# Patient Record
Sex: FEMALE | URBAN - METROPOLITAN AREA
[De-identification: names, ages, dates, MRNs, and addresses within clinical notes are randomized per-mention and may not be internally consistent; named-entity substitution may affect disease eponyms.]

---

## 2019-07-09 ENCOUNTER — OFFICE VISIT (OUTPATIENT)
Dept: URBAN - METROPOLITAN AREA CLINIC 48 | Facility: CLINIC | Age: 74
End: 2019-07-09
Payer: COMMERCIAL

## 2019-07-09 DIAGNOSIS — H10.13 ACUTE ATOPIC CONJUNCTIVITIS, BILATERAL: ICD-10-CM

## 2019-07-09 PROCEDURE — 92004 COMPRE OPH EXAM NEW PT 1/>: CPT | Performed by: OPTOMETRIST

## 2019-07-09 ASSESSMENT — INTRAOCULAR PRESSURE
OS: 15
OD: 19

## 2019-07-09 NOTE — IMPRESSION/PLAN
Impression: Acute atopic conjunctivitis, bilateral: H10.13. OU. Plan: Cold compresses prn, Zaditor BID OU x 2-3 weeks. RTC if symptoms do not improve in 2-3 weeks, consider Alrex for redness.

## 2019-07-09 NOTE — IMPRESSION/PLAN
Impression: Combined forms of age-related cataract, bilateral: H25.813. OU. Plan: Discussed cataract diagnosis with the patient. Discussed and reviewed treatment options for cataracts. Risks and benefits of surgical treatment were discussed and understood. Refer for jasson galo/Dr. Lupe Hughes, OS>OD.

## 2019-08-06 ENCOUNTER — OFFICE VISIT (OUTPATIENT)
Dept: URBAN - METROPOLITAN AREA CLINIC 48 | Facility: CLINIC | Age: 74
End: 2019-08-06
Payer: COMMERCIAL

## 2019-08-06 PROCEDURE — 92012 INTRM OPH EXAM EST PATIENT: CPT | Performed by: OPHTHALMOLOGY

## 2019-08-06 ASSESSMENT — KERATOMETRY
OD: 44.38
OS: 44.13

## 2019-08-06 ASSESSMENT — INTRAOCULAR PRESSURE
OS: 15
OD: 16

## 2019-08-06 NOTE — IMPRESSION/PLAN
Impression: Combined forms of age-related cataract, bilateral: H25.813. Plan: The patient has a visually significant cataract in both eyes. After discussion with the patient and careful examination it has been determined that a cataract in both eyes is accounting for a significant amount of the patient's visual symptoms. Cataract surgery and the associated risks, benefits, alternatives, expectations, and recovery were discussed in detail with the patient. All questions were answered. The patient understands that there may be some limitation in visual potential given any pre-existing ocular disease. The patient desires cataract surgery in both eyes. Schedule cataract surgery in both eyes; left eye, then right eye. RL 2 Patient decides to go with Standard lens, distance target Dr. Miguel Bernal

## 2019-08-13 ENCOUNTER — TESTING ONLY (OUTPATIENT)
Dept: URBAN - METROPOLITAN AREA CLINIC 48 | Facility: CLINIC | Age: 74
End: 2019-08-13
Payer: COMMERCIAL

## 2019-08-13 DIAGNOSIS — H25.813 COMBINED FORMS OF AGE-RELATED CATARACT, BILATERAL: Primary | ICD-10-CM

## 2019-08-13 PROCEDURE — 92136 OPHTHALMIC BIOMETRY: CPT | Performed by: OPHTHALMOLOGY

## 2019-08-13 RX ORDER — KETOROLAC TROMETHAMINE 5 MG/ML
0.5 % SOLUTION OPHTHALMIC
Qty: 5 | Refills: 1 | Status: INACTIVE
Start: 2019-08-13 | End: 2021-03-12

## 2019-08-13 RX ORDER — OFLOXACIN 3 MG/ML
0.3 % SOLUTION/ DROPS OPHTHALMIC
Qty: 5 | Refills: 1 | Status: INACTIVE
Start: 2019-08-13 | End: 2021-03-12

## 2019-08-13 RX ORDER — PREDNISOLONE ACETATE 10 MG/ML
1 % SUSPENSION/ DROPS OPHTHALMIC
Qty: 10 | Refills: 1 | Status: INACTIVE
Start: 2019-08-13 | End: 2021-03-12

## 2019-08-13 ASSESSMENT — PACHYMETRY
OS: 22.75
OD: 2.40
OD: 22.52
OS: 2.62

## 2019-08-20 ENCOUNTER — SURGERY (OUTPATIENT)
Dept: URBAN - METROPOLITAN AREA SURGERY 26 | Facility: SURGERY | Age: 74
End: 2019-08-20
Payer: COMMERCIAL

## 2019-08-20 PROCEDURE — 66984 XCAPSL CTRC RMVL W/O ECP: CPT | Performed by: OPHTHALMOLOGY

## 2019-08-21 ENCOUNTER — POST-OPERATIVE VISIT (OUTPATIENT)
Dept: URBAN - METROPOLITAN AREA CLINIC 48 | Facility: CLINIC | Age: 74
End: 2019-08-21

## 2019-08-21 PROCEDURE — 99024 POSTOP FOLLOW-UP VISIT: CPT | Performed by: OPHTHALMOLOGY

## 2019-08-21 ASSESSMENT — INTRAOCULAR PRESSURE: OS: 20

## 2019-08-27 ENCOUNTER — POST-OPERATIVE VISIT (OUTPATIENT)
Dept: URBAN - METROPOLITAN AREA CLINIC 48 | Facility: CLINIC | Age: 74
End: 2019-08-27

## 2019-08-27 PROCEDURE — 99024 POSTOP FOLLOW-UP VISIT: CPT | Performed by: OPHTHALMOLOGY

## 2019-08-27 ASSESSMENT — INTRAOCULAR PRESSURE: OS: 20

## 2019-09-13 ENCOUNTER — POST-OPERATIVE VISIT (OUTPATIENT)
Dept: URBAN - METROPOLITAN AREA CLINIC 48 | Facility: CLINIC | Age: 74
End: 2019-09-13

## 2019-09-13 PROCEDURE — 99024 POSTOP FOLLOW-UP VISIT: CPT | Performed by: OPTOMETRIST

## 2019-09-13 ASSESSMENT — INTRAOCULAR PRESSURE: OS: 11

## 2019-10-15 ENCOUNTER — SURGERY (OUTPATIENT)
Dept: URBAN - METROPOLITAN AREA SURGERY 26 | Facility: SURGERY | Age: 74
End: 2019-10-15
Payer: COMMERCIAL

## 2019-10-15 PROCEDURE — 66984 XCAPSL CTRC RMVL W/O ECP: CPT | Performed by: OPHTHALMOLOGY

## 2019-10-16 ENCOUNTER — POST-OPERATIVE VISIT (OUTPATIENT)
Dept: URBAN - METROPOLITAN AREA CLINIC 48 | Facility: CLINIC | Age: 74
End: 2019-10-16

## 2019-10-16 DIAGNOSIS — Z09 ENCNTR FOR F/U EXAM AFT TRTMT FOR COND OTH THAN MALIG NEOPLM: Primary | ICD-10-CM

## 2019-10-16 PROCEDURE — 99024 POSTOP FOLLOW-UP VISIT: CPT | Performed by: OPTOMETRIST

## 2019-10-16 ASSESSMENT — INTRAOCULAR PRESSURE: OD: 15

## 2019-10-22 ENCOUNTER — POST-OPERATIVE VISIT (OUTPATIENT)
Dept: URBAN - METROPOLITAN AREA CLINIC 48 | Facility: CLINIC | Age: 74
End: 2019-10-22
Payer: COMMERCIAL

## 2019-10-22 PROCEDURE — 99024 POSTOP FOLLOW-UP VISIT: CPT | Performed by: OPTOMETRIST

## 2019-10-22 ASSESSMENT — INTRAOCULAR PRESSURE
OS: 14
OD: 14

## 2021-03-12 ENCOUNTER — OFFICE VISIT (OUTPATIENT)
Dept: URBAN - METROPOLITAN AREA CLINIC 48 | Facility: CLINIC | Age: 76
End: 2021-03-12
Payer: COMMERCIAL

## 2021-03-12 DIAGNOSIS — E11.9 TYPE 2 DIABETES MELLITUS W/O COMPLICATION: Primary | ICD-10-CM

## 2021-03-12 DIAGNOSIS — H26.492 OTHER SECONDARY CATARACT OF LEFT EYE: ICD-10-CM

## 2021-03-12 PROCEDURE — 99214 OFFICE O/P EST MOD 30 MIN: CPT | Performed by: OPTOMETRIST

## 2021-03-12 ASSESSMENT — INTRAOCULAR PRESSURE
OS: 13
OD: 12

## 2021-03-12 NOTE — IMPRESSION/PLAN
Impression: Type 2 diabetes mellitus w/o complication: P47.8. Plan: No diabetic retinopathy present on exam. No treatment is needed. Discussed with patient the importance of glucose control and ocular risk to prevent the progression to Retinopathy.  

Follow up annually with dilated fundus exam.

## 2021-03-12 NOTE — IMPRESSION/PLAN
Impression: Other secondary cataract of left eye: H26.492. Plan: Trace PCO noted on exam.  Discussed with patient. Will monitor for now. RTC 1 year or PRN for decreased vision or increased glare.

## 2023-01-04 ENCOUNTER — OFFICE VISIT (OUTPATIENT)
Dept: URBAN - METROPOLITAN AREA CLINIC 48 | Facility: CLINIC | Age: 78
End: 2023-01-04
Payer: COMMERCIAL

## 2023-01-04 DIAGNOSIS — E11.9 TYPE 2 DIABETES MELLITUS W/O COMPLICATION: Primary | ICD-10-CM

## 2023-01-04 DIAGNOSIS — H04.123 DRY EYE SYNDROME OF BILATERAL LACRIMAL GLANDS: ICD-10-CM

## 2023-01-04 PROCEDURE — 99204 OFFICE O/P NEW MOD 45 MIN: CPT | Performed by: STUDENT IN AN ORGANIZED HEALTH CARE EDUCATION/TRAINING PROGRAM

## 2023-01-04 ASSESSMENT — VISUAL ACUITY
OS: 20/30
OD: 20/25

## 2023-01-04 ASSESSMENT — INTRAOCULAR PRESSURE
OS: 15
OD: 16

## 2023-01-04 NOTE — IMPRESSION/PLAN
Impression: Dry eye syndrome of bilateral lacrimal glands: H04.123. Plan: Dry eyes likely cause for the patient's complaints. There is no evidence of permanent changes to the cornea. Explained the nature of the condition and need for lubrication with artificial tears Start AT QID OU and Pataday QD OU. 

 RTC 1 month dry eye check

## 2023-01-04 NOTE — IMPRESSION/PLAN
Impression: Type 2 diabetes mellitus w/o complication: Q44.4.  Plan: - No retinopathy seen on exam today
- Continue glucose, BP, and lipid control as per PCP
- Continue with annual DFE

## 2023-11-01 NOTE — IMPRESSION/PLAN
CARDIOLOGY OFFICE NOTE        ORIGINAL REASON FOR CONSULT:  Palpitations/Aortic Valve Insufficiency (11/1/2023)  Michelle Hoover MD   [address] [address]    Farida Rios is a 50 year old female with the following issues: CARDIAC INVESTIGATIONS/IMAGING   1. Age 50  2. HTN / Dyslipidemia   3. DM II   4. Obesity Body mass index is 31.64 kg/m².   5. Carpal Tunnel s/p L release 11/22/10, s/p R release 12/15/14 (Dr. Hwang)  6. Seronegative RA (was on Xeljanz)  7. COVID + (01/07/22)    NT proBNP:  None  10 year ASCVD risk:  6.3%  [id number] Score: 3   H2FpEF Score:  3  NYHA FC:    LEAST WEIGHT:     LPa: NA  Plaque Years: 8,352 (, 10/4/23) 1. ECHO (07/28/2023): LVEF 63% E/e' 11.24 RVSP 20 mmHg GLS -21% Nadine 30.9 ml/m²  2. ECG (02/06/20):  NSR. Nonspecific T abnormality. Prolonged QT  3. 1 WK MONITOR (08/04/21): NSR. Avg HR 80 bpm. No PAV/PAC.   4. 48 HR HOLTER (07/21/21): NSR. Avg HR 90,  bpm. <0.01% PAC/PVC burden         Ms. Rios presents upon referral by her PCP for evaluation of palpitations. 48 hr holter and 7 day event monitor in 2021 were done d/t similar symptoms; results were unremarkable. This was the first referral to cardiology; however, patient was referred again for another diagnosis.  Patient saw PCP on 6/29/23 where patient was due for f/u echocardiogram this year d/t aortic regurgitation. Echo continued to show moderate aortic regurgitation and her LV chambers had increased in size. Patient was referred to cardiology for further evaluation.    Today, patient reports that she feels \"good, but tired.\" She reports SOB on exertion. She has had episodes since 2021 that have remained stable over the last couple of years. Patient states that she does not have difficulty doing the activities around her house, however, she has low energy level. She reports palpitations, with exercise and at rest. She often feels her heart \"pounding.\" The \"pounding\" is a daily occurrence for her,  Impression: Type 2 diabetes mellitus w/o complication: E43.9. OU. Plan: No Non-Proliferative Diabetic Retinopathy, no Diabetic Macular Edema and no Neovascularization of the iris, disc, or elsewhere. Discussed ocular and systemic benefits of blood sugar control. Send notes to PCP. Check annually. and she states that her heart feels like it is constantly beating rapidly. She has never been worked up for FLORENCIA, and has never been told that she snores. Patient reports occasional swelling in RLE. States that she had a surgery on RLE for a fibrotic cyst that she had removed in 2015. Patient reports that she just recently started back on her pravastatin d/t high cholesterol levels noted by PCP. She is taking all medications as prescribed. Denies claudication symptoms. Denies bleeding concerns, other than hemorrhoids. No home BP readings. Patient reports that she has lost 20lbs since starting Ozempic about a year ago.  Patient reports RA. She has bee off Xeljanz since July.           Patient at this time denies history of orthopnea, paroxysmal nocturnal dyspnea, lightheadedness/dizziness, presyncope/syncope, and any anginal type chest pain.    ECHO TREND     DATE   LVEF   LVEDD  06/22/2021  62%   5.7 CM  06/29/2023  63%   5.6 CM    CARDIAC MEDICATION CHANGES     NA    ACTIVITY LEVEL     No formal exercise routine.  Remains active around the house.       REVIEW OF SYSTEMS     Negative other than what has been specified in the history.     RECENT HOSPITALIZATION/S     None    PERSONAL/SOCIAL HISTORY     Works at [employer] as a  of the [employer] in [place].  Lives in the [place] area.   Has 1 son, Miles.    FAMILY HISTORY     Mother had a stroke around age 69.    No family history of SCD (sudden cardiac death) or premature CAD.    SURGICAL HISTORY     Past Surgical History:   Procedure Laterality Date   • ----------mammogram----------  05/04/2015   • Breast surgery Bilateral 2003    Breast Reduction- Dr Donis   • Breast surgery Right 02/2020    Rt excisional biopsy: 3 fibroadenomas and papillomatosis   • Carpal tunnel release Right 12/15/2014    Carpal tunnel release, Dequervain's release, flexor carpi radialis tunnel decompression & Kenalog injection Dr Hwang   • Carpal tunnel release Left 11/05/2010    •  delivery+postpartum care         • Colonoscopy w biopsy  2012    Jac:  [facility] 5 yrs:  repeat age 52 per Dr. Nathan t/e   • Esophagogastroduodenoscopy  2013   • Esophagogastroduodenoscopy transoral flex w/bx single or mult  2011    Gastritis - Dr Pierce   • Excis lesn tendon shealth leg/ankle Right 2018    Dr. Elder Mustafa    • Excis uterine fibroid,abd apprch  03    Myomectomy--uterine cavity not entered   • Excise breast lesion  1/19/10    R breast - Dr. Mccarthy   • Forearm/wrist surgery unlisted Left 10/10/2011    FCR TUNNEL DECOMPRESSION & REVISION KELOID SCAR    • Hand finger surgery unlisted Bilateral 2020    Rt thumb & lt long finger trig rels (Dr. Hwang)   • Hysterectomy  2009    supra-cervical hysterectomy - right salpingo-oophorectomy   • Incision extensor tendon sheath wrist Left 2010    Ctr poss volar tenosynovectomy & Deq Rels  (Dr. Hwang)   • Laparoscopy,abdm,leroy,oment,dx  97    Laproscopy nl pelvis   • Laparoscopy,fulgur of oviducts  2006    Tubal Ligation, by fulgration   • Lithotripsy - gen'l class     • Past surgical history Left     ovariectomy   • Robotic assisted gynecologic procedure Left 2016    RA LSO (ovariectomy)   • Stereotactic breast biopsy Right 2009    Right Vacuum-Assisted Breast Bx-EMQ-   • Total abdom hysterectomy      with RSO       FLORENCIA RISK ASSESSMENT     Has never been worked up for FLORENCIA. She has not been told that she snores.     PAD/AAA RISK ASSESSMENT (ULTRASOUND/ABIs)     NA    CURRENT MEDICATIONS     Current Outpatient Medications   Medication Sig Dispense Refill   • potassium chloride (K-TAB) 20 MEQ ER tablet 1 tablet 3 times a day 270 tablet 3   • Semaglutide, 1 MG/DOSE, 4 MG/3ML Solution Pen-injector Inject 1 mg into the skin 1 day a week. Indications: Type 2 Diabetes 3 mL 0   • Semaglutide, 2 MG/DOSE, 8 MG/3ML Solution Pen-injector Inject 2 mg into the skin  1 day a week. Indications: Type 2 Diabetes 3 mL 5   • Semaglutide, 2 MG/DOSE, (Ozempic, 2 MG/DOSE,) 8 MG/3ML Solution Pen-injector Inject 2 mg into the skin 1 day a week. 3 mL 5   • Tofacitinib Citrate ER (Xeljanz XR) 11 MG TABLET SR 24 HR Take 1 tablet by mouth daily.     • omeprazole (PrilOSEC) 20 MG capsule Take 1 capsule by mouth half an hour before breakfast 30 capsule 1   • chlorthalidone (THALITONE) 25 MG tablet Take 1 tablet by mouth daily. 90 tablet 1   • pravastatin (PRAVACHOL) 40 MG tablet Take 1 tablet by mouth daily. 90 tablet 1   • metFORMIN (GLUCOPHAGE) 500 MG tablet Take 1 tablet by mouth in the morning and 1 tablet in the evening. Take with meals. 90 tablet 3   • blood glucose test strip Test blood sugar once daily. Diagnosis: E11.9. 100 each 3   • dilTIAZem (CARDIZEM CD) 240 MG 24 hr capsule Take 1 capsule by mouth daily. 90 capsule 1   • estradiol (ESTRACE) 1 MG tablet Take 1 tablet by mouth daily. 90 tablet 3   • electrolyte/PEG 3350 (Nulytely with Flavor Packs) 420 g solution TAKE AS DIRECTED AS INDICATED IN COLONOSCOPY PATIENT INSTRUCTION LETTER 4000 mL 0   • Vitamin D, Ergocalciferol, 1.25 mg (50,000 units) capsule TAKE ONE CAPSULE BY MOUTH ONCE A MONTH 4 capsule 3   • Tofacitinib Citrate ER 11 MG TABLET SR 24 HR Take 1 tablet by mouth daily.     • DISPENSE Testosterone 0.2% in cream. Use 1 cc of cream to inner elbow skin daily 30 mg 11   • blood glucose lancets Test blood sugar once daily. Diagnosis: E11.9. 100 each 3   • blood glucose meter Test blood sugar  daily. Diagnosis: E11.9. Meter: Insurance Approved 1 kit 0   • DISPENSE gym membership  dx htn obesity 1 Each 0     No current facility-administered medications for this visit.        PHYSICAL EXAMINATION         6/23/2023     4:15 PM 6/29/2023     1:25 PM 7/25/2023     1:12 PM 10/4/2023     9:57 AM 11/1/2023     7:20 AM   [facility] Extended Vitals - Weight in Kg/Lb   BP  128/56 133/62 128/62 134/71   Pulse  76 76 82 81   Resp    20    Weight  kg 86.3 kg 90.719 kg  91.173 kg 91.627 kg   Weight lb 190 lb 4.1 oz 200 lb  201 lb 202 lb   Height 5' 7\"    5' 7\"   Height cm 170.2 cm    170.2 cm   BMI 29.8    31.64   Pulse Ox  99 %  98 % 97 %   Patient Position  Sitting  Sitting Sitting   BP Location  LUE - Left upper extremity  RUE - Right upper extremity LUE - Left upper extremity   Cuff Size  Regular  Regular Regular     General:  No acute distress.  HEENT:  PERRL, normocephalic/atraumatic, clear oropharynx.  Neck:  Supple, FROM.  Trachea central.  No JVD (jugular vein distention) or carotid bruit.  CVS:  S1, S2 normal.  3/6 systodiastolic murmur at aortic area.   Pulm:  Clear to auscultation/percussion.  No wheeze/rhonchi/rales.  Ext:  No cyanosis/clubbing/edema.  Skin:  No rash/palpable nodules.    LABORATORY     Recent Labs   Lab 10/04/23  1045 10/02/23  1757 07/28/23  1000 06/29/23  1422 06/22/23  0733 01/18/23  1033 12/30/22  1438   HGB  --  11.3* 12.3  --  12.7   < > 12.6   PLT  --  319 288  --  323   < > 327   Sodium  --  141 140  --  139   < > 140   Potassium  --  3.3* 3.7  --  3.7   < > 3.7   BUN  --  10 14  --  15   < > 13   Creatinine  --  0.86 0.85  --  0.93   < > 0.81   Glomerular Filtration Rate  --  83 84  --  76   < > 89   Hemoglobin A1C  --  5.9*  --  5.8*  --   --   --    Cholesterol 273*  --   --   --   --   --   --    HDL 70  --   --   --   --   --   --    Cholesterol/ HDL Ratio 3.9  --   --   --   --   --   --    CALCLDL 174*  --   --   --   --   --   --    Triglycerides 144  --   --   --   --   --   --    Vitamin D, 25-Hydroxy  --   --   --   --   --   --  32.9   C-Reactive Protein  --  0.9  --   --   --   --   --    Iron  --  42*  --   --   --   --   --    Iron, Percent Saturation  --  13*  --   --   --   --   --    Iron Binding Capacity  --  314  --   --   --   --   --    Ferritin  --  29  --   --   --   --   --     < > = values in this interval not displayed.        ECHOCARDIOGRAM 07/28/2023     Mildly increased left ventricular  chamber size.  Normal left ventricular systolic function.  Left ventricular ejection fraction, 63%.  No left ventricular regional wall motion abnormalities.  Normal LV global longitudinal strain, -21%.  Normal right ventricular chamber size.  Normal right ventricular systolic function.  Normal left atrial chamber size.  Normal right atrial chamber size.  Probably moderate aortic valve regurgitation.  Mild pulmonary valve regurgitation.  Compared to the TTE performed on 7/15/2021, mild changes are noted.    UPCOMING APPOINTMENTS     Future Appointments   Date Time Provider Department Center   12/1/2023  9:20 AM Michelle Hoover MD [facility]      Farida Rios is a 50 year old female with the following cardiovascular profile:    1. Age 50  2. Obesity   3. Aortic regurgitation  4. Diabetes mellitus  5. Hypertension  6. Dyslipidemia    Middle-aged female with rheumatoid arthritis.   I think the aortic regurgitation is likely secondary from aortic valvulitis  Aortic regurgitation needs to be quantitated  Initial recommendations are as under    RECOMMENDATIONS     1. EKG today.    2. Cardiac MRI for qantification of MR  3. BMP/Troponin/LPa   4. CTA/FFR for dyspnea. Hold chlorthalidone the day before and the day of CTA  5. Crestor 40 with CoQ10 100mg   6. 1 week monitor    Follow up in 2 months.    Thank you for your kind consultation.     On 11/1/2023, ISruthi, attest that I performed the duties of scribe in the presence of BENJAMÍN Lim MD.    The documentation recorded by the scribe accurately and completely reflects the service(s) I personally performed and the decisions made by me.     I, KAVITHA Wong ([facility]), MD, have personally taken a history, performed a physical examination of the patient, reviewed the clinical data, labs, imaging, ecg.  I have reviewed and edited the above note as needed.  I have personally formulated the clinical impression and recommendations as stated.  I attest that I  performed all of the medical decision-making for the \"substantive portion\" of this visit.     KAVITHA Wong, MD  [phone]

## 2025-06-24 ENCOUNTER — OFFICE VISIT (OUTPATIENT)
Dept: URBAN - METROPOLITAN AREA CLINIC 46 | Facility: CLINIC | Age: 80
End: 2025-06-24
Payer: COMMERCIAL

## 2025-06-24 DIAGNOSIS — Z96.1 PRESENCE OF INTRAOCULAR LENS: ICD-10-CM

## 2025-06-24 DIAGNOSIS — E11.9 DIABETES MELLITUS TYPE 2 WITHOUT MENTION OF COMPLICATION: Primary | ICD-10-CM

## 2025-06-24 PROCEDURE — 92004 COMPRE OPH EXAM NEW PT 1/>: CPT | Performed by: OPTOMETRIST

## 2025-06-24 ASSESSMENT — INTRAOCULAR PRESSURE
OS: 13
OD: 13

## 2025-06-24 ASSESSMENT — KERATOMETRY
OS: 43.52
OD: 43.81